# Patient Record
(demographics unavailable — no encounter records)

---

## 2024-12-20 NOTE — HISTORY OF PRESENT ILLNESS
[FreeTextEntry1] : Mr. LOUISE is a 33 year male presenting for evaluation of chest pain and recent admission to [facility]  Notable PMHx: - myocarditis - family history of premature CAD - grandfather and father in 30s/40s with MI - never tobacco smoker - former marijuana smoker - no Congenital Heart Disease  HPI:  Since last visit, had an isolated episode of chest/epigastric 1-2 weeks ago that occurred while he was walking. Thinks it may have been a few minutes. Can't recall the last episode of pain since then. He was unable to complete the cardiac MRI and follow up visit in 9/2024 as he had lost his job and insurance shortly after his visit with me. He has since returned to exercising doing light jogging and light weights at home. He feels well doing this without issue. He was previously taking cholesterol medication following discharge from the hospital but stopped when he ran out. He has been adhering to a healthy diet.   8/12/24 Was noticing a week of chest pressure/heaviness waxing and waning but present to some degree, radiating to back and intermittently to right shoulder, no other associated symptoms. He was feeling well prior to this. No recent viral illness. Felt worse when laying down. Went to urgent care and then due to concerning EKG findings went to [facility] ER. They noted elevated troponin and inferior T wave inversion prompting initiation of treatment for ACS and transfer to [facility] ED. CTA c/a/p negative for dissection and otherwise wnl . Troponin from 227--> 221.  with MB of 7. ntproBNP 204.  No change in pain with SL nitro. His other labs were notable for VIANCA. Echo showed nml LVEF and no RWMA. Cath showed normal coronaries. He was discharge on atorvastatin as only medication given elevated LDL and absence of coronary disease. Was trying to get back to exercise but hasn't yet due to laziness, not pain. has been taking baby aspirin as per medical family member. no other nsaid use. not very active right now for no particular reason. Chest pain has been improving.   Social Hx: lives alone, marijauna use (though quit prior to hospitalization for reasons unrelated to his presentation), never smoker, on worker comp d/t finger injury while working for [employer], works for [employer]   Data Review Labs  - 8/2024:  Troponin from 227--> 221.  with MB of 7. ntproBNP 20 EKG  - 12/0/24 - 8/12/2024: SR with inferolateral TWIs. Lateral twi new compared to prior EKG. Echo - 8/5/24 TTE: Left ventricular systolic function is normal with an ejection fraction visually estimated at 55 %. Normal valves. no rwma. no lv thrombus Cardiac MRI - no prior

## 2024-12-20 NOTE — DISCUSSION/SUMMARY
[FreeTextEntry1] : Presenting for follow up for resolving, likely viral, myocarditis but fortunately pain is mostly resolved and no signs of LV systolic dysfunction. Unclear etiology. We will check ESR, CRP, troponin, and CK today and if these are normal he will have no exercise or employment restrictions related to his heart. If abnormal, will pursue cardiac MRI. We will check cholesterol panel today given fam hx of premature CAD and elevated LDL a few months ago  # Myocarditis - check ESR, CRP, troponin, CK, CK-MB  RTC in 1 year or sooner PRN/based on above testing [EKG obtained to assist in diagnosis and management of assessed problem(s)] : EKG obtained to assist in diagnosis and management of assessed problem(s)

## 2024-12-20 NOTE — HISTORY OF PRESENT ILLNESS
[FreeTextEntry1] : Mr. LOUISE is a 33 year male presenting for evaluation of chest pain and recent admission to [facility]  Notable PMHx: - myocarditis - family history of premature CAD - grandfather and father in 30s/40s with MI - never tobacco smoker - former marijuana smoker - no Congenital Heart Disease  HPI:  Since last visit, had an isolated episode of chest/epigastric 1-2 weeks ago that occurred while he was walking. Thinks it may have been a few minutes. Can't recall the last episode of pain since then. He was unable to complete the cardiac MRI and follow up visit in 9/2024 as he had lost his job and insurance shortly after his visit with me. He has since returned to exercising doing light jogging and light weights at home. He feels well doing this without issue. He was previously taking cholesterol medication following discharge from the hospital but stopped when he ran out. He has been adhering to a healthy diet.   8/12/24 Was noticing a week of chest pressure/heaviness waxing and waning but present to some degree, radiating to back and intermittently to right shoulder, no other associated symptoms. He was feeling well prior to this. No recent viral illness. Felt worse when laying down. Went to urgent care and then due to concerning EKG findings went to [facility] ER. They noted elevated troponin and inferior T wave inversion prompting initiation of treatment for ACS and transfer to [facility] ED. CTA c/a/p negative for dissection and otherwise wnl . Troponin from 227--> 221.  with MB of 7. ntproBNP 204.  No change in pain with SL nitro. His other labs were notable for VIANCA. Echo showed nml LVEF and no RWMA. Cath showed normal coronaries. He was discharge on atorvastatin as only medication given elevated LDL and absence of coronary disease. Was trying to get back to exercise but hasn't yet due to laziness, not pain. has been taking baby aspirin as per medical family member. no other nsaid use. not very active right now for no particular reason. Chest pain has been improving.   Social Hx: lives alone, marijauna use (though quit prior to hospitalization for reasons unrelated to his presentation), never smoker, on worker comp d/t finger injury while working for [employer], works for [employer]   Data Review Labs  - 8/2024:  Troponin from 227--> 221.  with MB of 7. ntproBNP 20 EKG  - 12/0/24 - 8/12/2024: SR with inferolateral TWIs. Lateral twi new compared to prior EKG. Echo - 8/5/24 TTE: Left ventricular systolic function is normal with an ejection fraction visually estimated at 55 %. Normal valves. no rwma. no lv thrombus Cardiac MRI - no prior

## 2025-01-06 NOTE — DISCUSSION/SUMMARY
[FreeTextEntry1] : Here for follow-up of myocarditis and recent labwork. May return to work and exercise without restrictions from cardiac perspective. Defer non-cardiac evaluation and limitations to his PCP. His BMI of 40 is concerning and will need aggressive lifestyle modifications along with statin therapy for now.  # HLD # Elevated lp(a) # Obesity Class III - start atorvastatin 40mg qd - referral to nutrition [facility] - repeat fasting lipid panel with A1c and vit D level in 6 months - counseled on lifestyle changes  # History of Myocarditis, resolved - may return to work and exercise without cardiac restriction - needs to have his work paperwork sent here again, was not scanned in at prior visit  RTC in 6 months for telehealth with fasting labs 1-2 days prior C/C: Dr. Andrzej Shaikh (once pt provides us with fax #)

## 2025-01-06 NOTE — HISTORY OF PRESENT ILLNESS
[Home] : at home, [unfilled] , at the time of the visit. [Medical Office: ([facility])___] : at the medical office located in  [Verbal consent obtained from patient] : the patient, [unfilled] [FreeTextEntry1] : Mr. LOUISE is a 34 year male presenting for management of HLD   Notable PMHx: - Obesity Class III - HLD - Elevated lp(a) - history of myocarditis (presumed viral in 8/2024) - family history of premature CAD - grandfather and father in 30s/40s with MI - never tobacco smoker - former marijuana smoker - no Congenital Heart Disease  HPI:  Since last visit, ESR, CK-MB, CK, BNP, and troponin all returned normal. CRP very mildly elevated likely insignificant. Lipid panel abnormal and lp(a) elevated. Endorses struggling with portion control. Otherwise feels well without complaints.   12/20/24 had an isolated episode of chest/epigastric 1-2 weeks ago that occurred while he was walking. Thinks it may have been a few minutes. Can't recall the last episode of pain since then. He was unable to complete the cardiac MRI and follow up visit in 9/2024 as he had lost his job and insurance shortly after his visit with me. He has since returned to exercising doing light jogging and light weights at home. He feels well doing this without issue. He was previously taking cholesterol medication following discharge from the hospital but stopped when he ran out. He has been adhering to a healthy diet.   8/12/24 Was noticing a week of chest pressure/heaviness waxing and waning but present to some degree, radiating to back and intermittently to right shoulder, no other associated symptoms. He was feeling well prior to this. No recent viral illness. Felt worse when laying down. Went to urgent care and then due to concerning EKG findings went to [facility] ER. They noted elevated troponin and inferior T wave inversion prompting initiation of treatment for ACS and transfer to [facility] ED. CTA c/a/p negative for dissection and otherwise wnl . Troponin from 227--> 221.  with MB of 7. ntproBNP 204.  No change in pain with SL nitro. His other labs were notable for VIANCA. Echo showed nml LVEF and no RWMA. Cath showed normal coronaries. He was discharge on atorvastatin as only medication given elevated LDL and absence of coronary disease. Was trying to get back to exercise but hasn't yet due to laziness, not pain. has been taking baby aspirin as per medical family member. no other nsaid use. not very active right now for no particular reason. Chest pain has been improving.   Social Hx: lives alone, marijauna use (though quit prior to hospitalization for reasons unrelated to his presentation), never smoker, on worker comp d/t finger injury while working for [employer], works for [employer]   Data Review Labs  - 12/2024: ESR, troponin, CK/CK-MB, BNP normal. CRP 9. Tchol 244, , HDL 37, , Non-  - 8/2024:  Troponin from 227--> 221.  with MB of 7. ntproBNP 20 EKG  - 12/20/24: SR. Low voltage. Nonspecific t wave changes, improved from prior. - 8/12/24: SR with inferolateral TWIs. Lateral twi new compared to prior EKG. Echo - 8/5/24 TTE: Left ventricular systolic function is normal with an ejection fraction visually estimated at 55 %. Normal valves. no rwma. no lv thrombus Stress Test - no prior Cardiac Cath - 8/6/24: normal coronary anatomy, right dominant. no coronary disease   Cardiac MRI - no prior

## 2025-01-06 NOTE — HISTORY OF PRESENT ILLNESS
[Home] : at home, [unfilled] , at the time of the visit. [Medical Office: ([facility])___] : at the medical office located in  [Verbal consent obtained from patient] : the patient, [unfilled] [FreeTextEntry1] : Mr. LOUISE is a 34 year male presenting for management of HLD   Notable PMHx: - Obesity Class III - HLD - Elevated lp(a) - history of myocarditis (presumed viral in 8/2024) - family history of premature CAD - grandfather and father in 30s/40s with MI - never tobacco smoker - former marijuana smoker - no Congenital Heart Disease  HPI:  Since last visit, ESR, CK-MB, CK, BNP, and troponin all returned normal. CRP very mildly elevated likely insignificant. Lipid panel abnormal and lp(a) elevated. Endorses struggling with portion control. Otherwise feels well without complaints.   12/20/24 had an isolated episode of chest/epigastric 1-2 weeks ago that occurred while he was walking. Thinks it may have been a few minutes. Can't recall the last episode of pain since then. He was unable to complete the cardiac MRI and follow up visit in 9/2024 as he had lost his job and insurance shortly after his visit with me. He has since returned to exercising doing light jogging and light weights at home. He feels well doing this without issue. He was previously taking cholesterol medication following discharge from the hospital but stopped when he ran out. He has been adhering to a healthy diet.   8/12/24 Was noticing a week of chest pressure/heaviness waxing and waning but present to some degree, radiating to back and intermittently to right shoulder, no other associated symptoms. He was feeling well prior to this. No recent viral illness. Felt worse when laying down. Went to urgent care and then due to concerning EKG findings went to [facility] ER. They noted elevated troponin and inferior T wave inversion prompting initiation of treatment for ACS and transfer to [facility] ED. CTA c/a/p negative for dissection and otherwise wnl . Troponin from 227--> 221.  with MB of 7. ntproBNP 204.  No change in pain with SL nitro. His other labs were notable for VIANCA. Echo showed nml LVEF and no RWMA. Cath showed normal coronaries. He was discharge on atorvastatin as only medication given elevated LDL and absence of coronary disease. Was trying to get back to exercise but hasn't yet due to laziness, not pain. has been taking baby aspirin as per medical family member. no other nsaid use. not very active right now for no particular reason. Chest pain has been improving.   Social Hx: lives alone, marijauna use (though quit prior to hospitalization for reasons unrelated to his presentation), never smoker, on worker comp d/t finger injury while working for [employer], works for [employer]   Data Review Labs  - 12/2024: ESR, troponin, CK/CK-MB, BNP normal. CRP 9. Tchol 244, , HDL 37, , Non-  - 8/2024:  Troponin from 227--> 221.  with MB of 7. ntproBNP 20 EKG  - 12/20/24: SR. Low voltage. Nonspecific t wave changes, improved from prior. - 8/12/24: SR with inferolateral TWIs. Lateral twi new compared to prior EKG. Echo - 8/5/24 TTE: Left ventricular systolic function is normal with an ejection fraction visually estimated at 55 %. Normal valves. no rwma. no lv thrombus Stress Test - no prior Cardiac Cath - 8/6/24: normal coronary anatomy, right dominant. no coronary disease   Cardiac MRI - no prior

## 2025-01-06 NOTE — HISTORY OF PRESENT ILLNESS
[Home] : at home, [unfilled] , at the time of the visit. [Medical Office: ([facility])___] : at the medical office located in  [Verbal consent obtained from patient] : the patient, [unfilled] [FreeTextEntry1] : Mr. LOUISE is a 34 year male presenting for management of HLD   Notable PMHx: - Obesity Class III - HLD - Elevated lp(a) - history of myocarditis (presumed viral in 8/2024) - family history of premature CAD - grandfather and father in 30s/40s with MI - never tobacco smoker - former marijuana smoker - no Congenital Heart Disease  HPI:  Since last visit, ESR, CK-MB, CK, BNP, and troponin all returned normal. CRP very mildly elevated likely insignificant. Lipid panel abnormal and lp(a) elevated. Endorses struggling with portion control. Otherwise feels well without complaints.   12/20/24 had an isolated episode of chest/epigastric 1-2 weeks ago that occurred while he was walking. Thinks it may have been a few minutes. Can't recall the last episode of pain since then. He was unable to complete the cardiac MRI and follow up visit in 9/2024 as he had lost his job and insurance shortly after his visit with me. He has since returned to exercising doing light jogging and light weights at home. He feels well doing this without issue. He was previously taking cholesterol medication following discharge from the hospital but stopped when he ran out. He has been adhering to a healthy diet.   8/12/24 Was noticing a week of chest pressure/heaviness waxing and waning but present to some degree, radiating to back and intermittently to right shoulder, no other associated symptoms. He was feeling well prior to this. No recent viral illness. Felt worse when laying down. Went to urgent care and then due to concerning EKG findings went to [facility] ER. They noted elevated troponin and inferior T wave inversion prompting initiation of treatment for ACS and transfer to [facility] ED. CTA c/a/p negative for dissection and otherwise wnl . Troponin from 227--> 221.  with MB of 7. ntproBNP 204.  No change in pain with SL nitro. His other labs were notable for VIANCA. Echo showed nml LVEF and no RWMA. Cath showed normal coronaries. He was discharge on atorvastatin as only medication given elevated LDL and absence of coronary disease. Was trying to get back to exercise but hasn't yet due to laziness, not pain. has been taking baby aspirin as per medical family member. no other nsaid use. not very active right now for no particular reason. Chest pain has been improving.   Social Hx: lives alone, marijauna use (though quit prior to hospitalization for reasons unrelated to his presentation), never smoker, on worker comp d/t finger injury while working for [employer], works for [employer]   Data Review Labs  - 12/2024: ESR, troponin, CK/CK-MB, BNP normal. CRP 9. Tchol 244, , HDL 37, , Non-  - 8/2024:  Troponin from 227--> 221.  with MB of 7. ntproBNP 20 EKG  - 12/20/24: SR. Low voltage. Nonspecific t wave changes, improved from prior. - 8/12/24: SR with inferolateral TWIs. Lateral twi new compared to prior EKG. Echo - 8/5/24 TTE: Left ventricular systolic function is normal with an ejection fraction visually estimated at 55 %. Normal valves. no rwma. no lv thrombus Stress Test - no prior Cardiac Cath - 8/6/24: normal coronary anatomy, right dominant. no coronary disease   Cardiac MRI - no prior